# Patient Record
Sex: FEMALE | URBAN - METROPOLITAN AREA
[De-identification: names, ages, dates, MRNs, and addresses within clinical notes are randomized per-mention and may not be internally consistent; named-entity substitution may affect disease eponyms.]

---

## 2024-08-14 DIAGNOSIS — M43.06 SPONDYLOLYSIS, LUMBAR REGION: Primary | ICD-10-CM

## 2024-08-19 ENCOUNTER — TELEPHONE (OUTPATIENT)
Dept: NEUROSURGERY | Facility: CLINIC | Age: 74
End: 2024-08-19
Payer: MEDICARE

## 2024-08-19 NOTE — TELEPHONE ENCOUNTER
Please schedule the patient sooner rather than later on either my or Katherine's schedule. Please have the patient complete AP/Lat/Flex/Ext views of the lumbar spine prior to the visit. She will also need an appointment with Dr. Andersen following this visit. Thanks

## 2024-08-19 NOTE — TELEPHONE ENCOUNTER
"Patient was referred to Dr. Andersen by Dr. Etienne on 08/14 for lumbar.    Per referring MD note: Patient's pain started months ago and is constant across lumbar radiating BLE. Pain is exacerbated with standing and walking. Her most recent inj relieved pain on LLE, but still having pain RLE. She has previously undergone a course of PT w/ no relief. She is taking prednisone 10mg & gabapentin 300mg BID.    CT lumbar performed on 04/16 at [facility] states: "there is a concentric disc bulge w/ bilateral posterior element spondylosis causing severe canal stenosis."    Emma- please review imaging for severity and let support staff know when to schedule.    Staff- when scheduling patient please advise on symptoms, prior brain or spine sx, where PT was performed, and if medication above is the only medication taken (helping? prescribed by?). Please send TONG to patient and retrieve any records needed.  "

## 2024-08-23 DIAGNOSIS — M43.06 SPONDYLOLYSIS, LUMBAR REGION: Primary | ICD-10-CM
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
## 2024-08-27 NOTE — PROGRESS NOTES
[facility]  History & Physical  Neurosurgery      Lesia Delcid   [id number]   1950       SUBJECTIVE:     CHIEF COMPLAINT:  Lower back pain, right leg pain  HPI:  Lesia Delcid is a 74 y.o. female who presents for neurosurgical evaluation at the recommendation of Dr. Arun Etienne. The patient presents today describing chronic lower back pain radiating into the right-greater-than-left lower extremities.  The patient states these symptoms have been present for years without any injury or trauma to account for it.  The patient states recently her symptoms did progress.  She attempted physical therapy as well as injection therapy unfortunately with no sustained benefit.  She states she also sought no improvement with oral steroids, gabapentin and tramadol.  The patient states with any type of standing or walking activity her symptoms will be aggravated.  Thankfully she does get relief with sitting.  She was able to find a comfortable position at night and is resting well.  She was rating her pain a 6/10 today.  She describes a constant burning aching and tight sensation radiating from the bilateral lower back into the right lower extremity.  She did see relief of her left lower extremity symptoms with injection therapy and feels these symptoms are manageable at this time.  She does report some numbness and tingling into the bilateral lower extremities as well as a strange pressured and tight sensation into the feet.  She was denying any issues with balance.  She was denying any changes in bowel or bladder function at this time. The patient has a significant history of 2wo bilateral L5 transforaminal epidural steroid injections followed by a right therapeutic L4 transforaminal epidural steroid injection with Dr. Etienne.      Past Medical History:   Diagnosis Date    Hyperlipidemia     Hypertension        Past Surgical History:   Procedure Laterality Date    ABDOMINAL SURGERY      HYSTERECTOMY   "       Family History   Problem Relation Name Age of Onset    No Known Problems Mother      Heart disease Father      Heart disease Brother         Social History     Socioeconomic History    Marital status:    Tobacco Use    Smoking status: Never    Smokeless tobacco: Never   Substance and Sexual Activity    Alcohol use: Never    Drug use: Never        Review of patient's allergies indicates:   Allergen Reactions    Codeine     Latex, natural rubber         Current Outpatient Medications   Medication Instructions    atorvastatin (LIPITOR) 40 mg, Oral    bisoprolol-hydrochlorothiazide 5-6.25 mg (ZIAC) 5-6.25 mg Tab 1 tablet, Oral    latanoprost 0.005 % ophthalmic solution 1 drop, Both Eyes, Nightly    losartan (COZAAR) 50 mg, Oral    magnesium oxide (MAG-OX) 400 mg, Oral, Daily          Review of Systems   Constitutional:  Negative for chills, fever and weight loss.   HENT:  Negative for congestion, hearing loss, nosebleeds and tinnitus.    Eyes:  Negative for blurred vision, double vision and photophobia.   Respiratory:  Negative for cough, shortness of breath and wheezing.    Cardiovascular:  Negative for chest pain, palpitations and leg swelling.   Gastrointestinal:  Negative for constipation, diarrhea, nausea and vomiting.   Genitourinary:  Negative for dysuria, frequency and urgency.   Musculoskeletal:  Positive for back pain. Negative for falls and neck pain.   Skin:  Negative for itching and rash.   Neurological:  Positive for tingling. Negative for dizziness, tremors, sensory change, speech change, seizures, loss of consciousness and headaches.   Psychiatric/Behavioral:  Negative for depression, hallucinations and memory loss. The patient is not nervous/anxious.        OBJECTIVE:     Visit Vitals  /78 (BP Location: Left arm, Patient Position: Sitting)   Pulse 62   Resp 16   Ht 5' 1" (1.549 m)   Wt 70.3 kg (155 lb)   BMI 29.29 kg/m²        Physical Exam    General:  Pleasant, Well-nourished, " Well-groomed.    Cardiovascular:  Neck is supple.  There are no carotid bruits.  Heart has regular rate and rhythm.    Lungs:  Breathing is quiet, non-lablored    Abdomen:  Soft, non-tender, non-distended.    Neurological:  Muscle strength against resistance:   Right Left   Deltoid (C5) 5/5 5/5   Biceps (C5/6) 5/5 5/5   Wrist Flexors (C5/6) 5/5 5/5   Triceps (C7) 5/5 5/5   Wrist extension (C7) 5/5 5/5   Finger abduction (C8) 5/5 5/5    5/5 5/5        Hip abduction 5/5 5/5   Hip adduction 5/5 5/5   Hip flexion (L2) 5/5 5/5   Knee extension (L3) 5/5 5/5   Knee flexion (L4) 5/5 5/5   Dorsiflexion (L5) 5/5 5/5   EHL (L5) 5/5 5/5   Plantar flexion (S1) 5/5 5/5   Sensation is intact to primary modalities in bilateral upper and lower extremities.    Reflexes:   Right Left   Triceps (C7) 2+ 2+   Biceps (C5) 2+ 2+   Brachioradialis (C6) 2+ 2+   Patellar (L4) 0 2+   Achilles (S1) 1+ 2+   Negative Clonus and Ramirez bilaterally.  Gait is normal  Some difficulty with walking on toes and heels  No tremor noted.    Imaging:  All pertinent neuroimaging independently reviewed. Discussed these findings in detail with the patient.    CT scan of the lumbar spine dated 4/16/2024 reveals L3-4 with disc bulge and bilateral facet hypertrophy causing bilateral foraminal stenosis.  L4-5 with disc bulge and severe canal stenosis.  Bilateral facet hypertrophy resulting in bilateral severe foraminal stenosis.  L5-S1 with disc bulging and facet hypertrophy as well as bilateral foraminal stenosis.    X-rays of the lumbar spine reveal multilevel degenerative changes with Schmorl's nodule to the superior endplate of L4.  Slight anterolisthesis at L4-5 with no abnormal motion on extension or flexion views.  Multilevel degenerative changes to the posterior elements.    ASSESSMENT:       ICD-10-CM ICD-9-CM   1. Spinal stenosis, lumbosacral region  M48.07 724.02   2. Spondylolysis, lumbar region  M43.06 738.4   3. Age-related osteoporosis without  current pathological fracture  M81.0 733.01     PLAN:     1. Spinal stenosis, lumbosacral region  - Ambulatory referral/consult to Pain Clinic; Future    2. Spondylolysis, lumbar region  - Ambulatory referral/consult to Neurosurgery    3. Age-related osteoporosis without current pathological fracture  - DXA Bone Density Axial Skeleton 1 or more sites    Lesia Delcid presents today chronic and progressive lower back pain that has persisted despite activity modification, medications, injection therapy and physical therapy.  I did take the time to review the patient's previous CT scan as well as x-rays with she and her daughter in clinic today.  At this time I would like to move forward with a bone density scan in prelude to the patient meeting with Dr. Andersen to discuss additional treatment options.  The patient would like to avoid surgical intervention if at all possible therefore we will refer her on for further consultation with Dr. Carr regarding a possible spinal cord stimulator trial.  We also briefly discussed VAX-D traction therapy although she was advised to hold off on this pending the results of her bone density scan.  I will notify the patient and her daughter of these results once they become available for my review.  We did discuss potential red flag findings for which I would like them to report.  This plan was discussed with the patient and her daughter and they are agreement to move forward.        E/M Level Based On Time:   15 minutes spent on reviewing chart, which includes interpreting lab results and diagnostic tests.   30 minutes spent in the room with the patient performing a history and physical exam, counseling or educating the patient/caregiver, prescribing medications, ordering labwork/diagnostic tests, or placing referrals.   0 minutes spent collaborating plan of care with physician.   5 minutes spent documenting all relevant clinical informationin the electronic health record.      Total Time Spent: 50 minutes       DAX Wright    Disclaimer:  This note is prepared using voice recognition software and as such is likely to have errors despite attempts at proofreading. Please contact me for questions.

## 2024-09-09 ENCOUNTER — TELEPHONE (OUTPATIENT)
Dept: NEUROSURGERY | Facility: CLINIC | Age: 74
End: 2024-09-09
Payer: MEDICARE

## 2024-09-09 RX ORDER — LOSARTAN POTASSIUM 50 MG/1
50 TABLET ORAL
COMMUNITY
Start: 2024-05-22

## 2024-09-09 RX ORDER — ATORVASTATIN CALCIUM 40 MG/1
40 TABLET, FILM COATED ORAL
COMMUNITY
Start: 2024-06-18

## 2024-09-09 RX ORDER — BISOPROLOL FUMARATE AND HYDROCHLOROTHIAZIDE 5; 6.25 MG/1; MG/1
1 TABLET ORAL
COMMUNITY
Start: 2024-05-22

## 2024-09-09 RX ORDER — LATANOPROST 50 UG/ML
1 SOLUTION/ DROPS OPHTHALMIC NIGHTLY
COMMUNITY
Start: 2024-05-21

## 2024-09-09 RX ORDER — PREDNISONE 10 MG/1
TABLET ORAL
COMMUNITY
Start: 2024-04-11 | End: 2024-09-10

## 2024-09-09 RX ORDER — TRAMADOL HYDROCHLORIDE 50 MG/1
50 TABLET ORAL
COMMUNITY
Start: 2024-04-11 | End: 2024-09-10

## 2024-09-09 RX ORDER — GABAPENTIN 300 MG/1
300 CAPSULE ORAL 2 TIMES DAILY
COMMUNITY
Start: 2024-04-30 | End: 2024-09-10

## 2024-09-09 NOTE — TELEPHONE ENCOUNTER
Called patient to verify if her Xr was done - No answer - Left VM     Called [facility] to verify if Xr was done - Spoke with Nai - stated that their computers are down and she is unable to check. Will have to callback later

## 2024-09-10 ENCOUNTER — OFFICE VISIT (OUTPATIENT)
Dept: NEUROSURGERY | Facility: CLINIC | Age: 74
End: 2024-09-10
Payer: MEDICARE

## 2024-09-10 VITALS
WEIGHT: 155 LBS | HEART RATE: 62 BPM | DIASTOLIC BLOOD PRESSURE: 78 MMHG | BODY MASS INDEX: 29.27 KG/M2 | HEIGHT: 61 IN | RESPIRATION RATE: 16 BRPM | SYSTOLIC BLOOD PRESSURE: 139 MMHG

## 2024-09-10 DIAGNOSIS — M81.0 AGE-RELATED OSTEOPOROSIS WITHOUT CURRENT PATHOLOGICAL FRACTURE: ICD-10-CM

## 2024-09-10 DIAGNOSIS — M48.07 SPINAL STENOSIS, LUMBOSACRAL REGION: Primary | ICD-10-CM

## 2024-09-10 DIAGNOSIS — M43.06 SPONDYLOLYSIS, LUMBAR REGION: ICD-10-CM

## 2024-09-10 PROCEDURE — 99204 OFFICE O/P NEW MOD 45 MIN: CPT | Mod: ,,, | Performed by: NURSE PRACTITIONER

## 2024-09-10 RX ORDER — LANOLIN ALCOHOL/MO/W.PET/CERES
400 CREAM (GRAM) TOPICAL DAILY
COMMUNITY

## 2024-11-20 ENCOUNTER — OFFICE VISIT (OUTPATIENT)
Dept: NEUROSURGERY | Facility: CLINIC | Age: 74
End: 2024-11-20
Payer: MEDICARE

## 2024-11-20 VITALS
RESPIRATION RATE: 16 BRPM | SYSTOLIC BLOOD PRESSURE: 156 MMHG | BODY MASS INDEX: 30.17 KG/M2 | HEART RATE: 60 BPM | DIASTOLIC BLOOD PRESSURE: 80 MMHG | HEIGHT: 61 IN | WEIGHT: 159.81 LBS

## 2024-11-20 DIAGNOSIS — M43.06 SPONDYLOLYSIS, LUMBAR REGION: Primary | ICD-10-CM

## 2024-11-20 DIAGNOSIS — G60.3 IDIOPATHIC PROGRESSIVE NEUROPATHY: ICD-10-CM

## 2024-11-20 PROCEDURE — 99214 OFFICE O/P EST MOD 30 MIN: CPT | Mod: ,,, | Performed by: STUDENT IN AN ORGANIZED HEALTH CARE EDUCATION/TRAINING PROGRAM

## 2024-11-20 RX ORDER — GABAPENTIN 300 MG/1
300 CAPSULE ORAL 3 TIMES DAILY
Qty: 90 CAPSULE | Refills: 11 | Status: SHIPPED | OUTPATIENT
Start: 2024-11-20 | End: 2025-11-20

## 2024-11-20 RX ORDER — ATORVASTATIN CALCIUM 40 MG/1
1 TABLET, FILM COATED ORAL DAILY
COMMUNITY

## 2024-11-20 RX ORDER — BISOPROLOL FUMARATE AND HYDROCHLOROTHIAZIDE 5; 6.25 MG/1; MG/1
1 TABLET ORAL DAILY
COMMUNITY

## 2024-11-20 RX ORDER — PREDNISOLONE ACETATE 10 MG/ML
SUSPENSION/ DROPS OPHTHALMIC
COMMUNITY
Start: 2024-11-01

## 2024-11-20 RX ORDER — LOSARTAN POTASSIUM 50 MG/1
1 TABLET ORAL DAILY
COMMUNITY

## 2024-11-20 RX ORDER — CYCLOBENZAPRINE HCL 5 MG
5 TABLET ORAL 3 TIMES DAILY PRN
Qty: 90 TABLET | Refills: 3 | Status: SHIPPED | OUTPATIENT
Start: 2024-11-20 | End: 2025-03-20
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
## 2024-12-10 ENCOUNTER — TELEPHONE (OUTPATIENT)
Dept: NEUROSURGERY | Facility: CLINIC | Age: 74
End: 2024-12-10
Payer: MEDICARE

## 2024-12-10 NOTE — TELEPHONE ENCOUNTER
I tried to call the patient to notify her of the appointment time changes for her upcoming appointment with Dr. Andersen for 1/27/2025. She did not answer so I left a detailed message requesting a call back.

## 2025-01-27 ENCOUNTER — OFFICE VISIT (OUTPATIENT)
Dept: NEUROSURGERY | Facility: CLINIC | Age: 75
End: 2025-01-27
Payer: MEDICARE

## 2025-01-27 VITALS — HEART RATE: 76 BPM | DIASTOLIC BLOOD PRESSURE: 88 MMHG | SYSTOLIC BLOOD PRESSURE: 128 MMHG

## 2025-01-27 DIAGNOSIS — M43.06 SPONDYLOLYSIS, LUMBAR REGION: Primary | ICD-10-CM

## 2025-01-27 DIAGNOSIS — M54.17 LUMBOSACRAL RADICULOPATHY: ICD-10-CM

## 2025-01-27 PROCEDURE — 99213 OFFICE O/P EST LOW 20 MIN: CPT | Mod: ,,, | Performed by: STUDENT IN AN ORGANIZED HEALTH CARE EDUCATION/TRAINING PROGRAM

## 2025-01-27 NOTE — PROGRESS NOTES
[facility]  Follow-up visit  Neurosurgery      Lesia Delcid   [id number]   1950       SUBJECTIVE:     CHIEF COMPLAINT:  Low back and BLLE pain    HPI:  Lesia Delcid is a 74 y.o. female who presents for follow up appointment. She was last seen in the office on November 20, 2024.  At that time, she complained of significant low back pain radiating to right greater than left lower extremities.  Treatment options were discussed.  She wished to continue with conservative management.  She has been now taking gabapentin as prescribed, 3 times a day and has found significant relief.  She rates her pain at 3/10 today.  Takes Flexeril p.r.n..  Takes Tylenol occasionally.  EMG was completed.  Here today to review results.    Past Medical History:   Diagnosis Date    Hyperlipidemia     Hypertension      Past Surgical History:   Procedure Laterality Date    ABDOMINAL SURGERY      HYSTERECTOMY       Family History   Problem Relation Name Age of Onset    No Known Problems Mother      Heart disease Father      Heart disease Brother       Social History     Socioeconomic History    Marital status:    Tobacco Use    Smoking status: Never    Smokeless tobacco: Never   Substance and Sexual Activity    Alcohol use: Never    Drug use: Never     Review of patient's allergies indicates:   Allergen Reactions    Codeine Other (See Comments)    Latex, natural rubber       Current Outpatient Medications   Medication Instructions    atorvastatin (LIPITOR) 40 MG tablet 1 tablet, Daily    bisoprolol-hydrochlorothiazide 5-6.25 mg (ZIAC) 5-6.25 mg Tab 1 tablet, Daily    cyclobenzaprine (FLEXERIL) 5 mg, Oral, 3 times daily PRN    gabapentin (NEURONTIN) 300 mg, Oral, 3 times daily    latanoprost 0.005 % ophthalmic solution 1 drop, Nightly    losartan (COZAAR) 50 MG tablet 1 tablet, Daily    magnesium oxide (MAG-OX) 400 mg, Daily    prednisoLONE acetate (PRED FORTE) 1 % DrpS Place into both eyes.     Review of  Systems  12 point review of systems conducted, negative except as stated in the history of present illness. See HPI for details.    OBJECTIVE:     Visit Vitals  /88 (BP Location: Right arm, Patient Position: Sitting)   Pulse 76      General:  Pleasant, Well-nourished, Well-groomed.    Lungs:  Breathing is quiet with non-labored respirations.    Musculoskeletal:   Straight leg raise is negative bilaterally.    Neurological:  The patient is awake, alert, and oriented in all 4 spheres.  Muscle strength against resistance:  Strength  Deltoids Triceps Biceps Wrist Extension Wrist Flexion Intrinsics   Upper: R 5/5 5/5 5/5 5/5 5/5 5/5    L 5/5 5/5 5/5 5/5 5/5 5/5     Iliopsoas Quadriceps Knee  Flexion Tibialis  anterior Gastro- cnemius EHL   Lower: R 5/5 5/5 5/5 5/5 5/5 5/5    L 5/5 5/5 5/5 5/5 5/5 5/5   Sensation is intact to primary modalities in bilateral lower extremities.  Giana sign is negative bilaterally.  Gait is normal.    Imaging:  All pertinent neuroimaging independently reviewed. Discussed these findings in detail with the patient.  No new imaging has been obtained. We reviewed again CT L spine without contrast completed in April 2024 which demonstrates multilevel degenerative changes/lumbar spondylosis, worse at L4-5 and L5-S1.      Other studies  EMG completed on December 5, 2024.  Evidence of a could on chronic L4 radiculopathy on the right with EMG evidence of ongoing denervation.  No EMG evidence of radiculopathy on the left.    DIAGNOSES:       ICD-10-CM ICD-9-CM   1. Spondylolysis, lumbar region  M43.06 738.4   2. Lumbosacral radiculopathy  M54.17 724.4     ASSESSMENT/PLAN:     74-year-old female with lumbar spondylosis and multilevel degenerative changes, worse at L4-5, L5-S1.  EMG consistent with acute on chronic L4 radiculopathy on the right.  Medical management with gabapentin has been effective in treating her symptoms.  She is very hesitant to undergo any type of invasive procedures.  She  is satisfied with her current level of symptom control.  I explained that her if her symptoms worsen we could obtain CT myelogram of her lumbar spine to further characterize central/foraminal stenosis and consider possible surgical intervention.  She knows to reach out with worsening symptoms or any concerns.    Follow up if symptoms worsen or fail to improve.    Rajesh Andersen MD  Neurosurgery  [facility]    25 minutes were personally spent on this visit including my review of available records, prior imaging, comprehensive physical and neurologic examination and discussion with the patient.     Disclaimer:  This note is prepared using voice recognition software and as such is likely to have errors despite attempts at proofreading.